# Patient Record
Sex: FEMALE | Race: WHITE | Employment: UNEMPLOYED | URBAN - METROPOLITAN AREA
[De-identification: names, ages, dates, MRNs, and addresses within clinical notes are randomized per-mention and may not be internally consistent; named-entity substitution may affect disease eponyms.]

---

## 2017-03-17 DIAGNOSIS — M35.9 CONNECTIVE TISSUE DISEASE, UNDIFFERENTIATED (H): ICD-10-CM

## 2017-03-20 RX ORDER — PREDNISONE 5 MG/1
TABLET ORAL
Qty: 20 TABLET | Refills: 1 | Status: SHIPPED | OUTPATIENT
Start: 2017-03-20 | End: 2017-03-30

## 2017-03-20 NOTE — TELEPHONE ENCOUNTER
predniSONE (DELTASONE) 5 MG      Last Written Prescription Date:  11/15/16  Last Fill Quantity: 20,   # refills: 1  Last Office Visit : 10/27/16  Future Office visit:  3/30/17

## 2017-03-27 DIAGNOSIS — M35.9 DIFFUSE DISEASE OF CONNECTIVE TISSUE (H): ICD-10-CM

## 2017-03-27 RX ORDER — HYDROXYCHLOROQUINE SULFATE 200 MG/1
200 TABLET, FILM COATED ORAL 2 TIMES DAILY
Qty: 180 TABLET | Refills: 1 | Status: SHIPPED | OUTPATIENT
Start: 2017-03-27 | End: 2017-03-30

## 2017-03-27 NOTE — TELEPHONE ENCOUNTER
hydroxychloroquine (PLAQUENIL) 200 MG tablet  Last Written Prescription Date:  12/9/16  Last Fill Quantity: 180,   # refills: 0  Last Office Visit with [facility], [facility] or [facility] prescribing provider: 10/27/16  Future Office visit:   3/30/17    Eye exam not found .  letter  Sent.     Routing refill request to provider for review/approval because:   hydroxychloroquine (PLAQUENIL) 200 MG tablet. Eye exam not found.

## 2017-03-27 NOTE — LETTER
Hipolito Hawley,    Regular eye exams are required while taking Hydroxychloroquine (Plaquenil). These may be yearly or as determined by your eye specialist.    Although vision problems and loss of sight while taking hydroxchloroquine are very rare, notify your doctor if you notice changes in your vision. Visual changes experienced early on the medication or seen early during regular eye exams usually improve after stopping the medication.     Eye exams should be completed by an ophthalmologist who is experienced in monitoring for Hydroxchloroquine toxicity.    Exam may include visual field testing and slit lamp exam or other testing as determined by the ophthalmologist.     We received a refill request from your pharmacy. A copy of your current eye exam was not found in your medical record. Your Hydroxychloroquine prescription has been refilled for one month.  Please request your eye clinic to fax or mail a copy of your eye exam report to our clinic indicating that testing was completed for toxicity screening.        Sincerely,  Rheumatology Staff

## 2017-03-30 ENCOUNTER — OFFICE VISIT (OUTPATIENT)
Dept: RHEUMATOLOGY | Facility: CLINIC | Age: 47
End: 2017-03-30
Attending: INTERNAL MEDICINE
Payer: COMMERCIAL

## 2017-03-30 VITALS
WEIGHT: 142 LBS | SYSTOLIC BLOOD PRESSURE: 157 MMHG | HEART RATE: 88 BPM | DIASTOLIC BLOOD PRESSURE: 102 MMHG | BODY MASS INDEX: 24.37 KG/M2

## 2017-03-30 DIAGNOSIS — R21 FACIAL RASH: ICD-10-CM

## 2017-03-30 DIAGNOSIS — L30.9 PERIORBITAL DERMATITIS: ICD-10-CM

## 2017-03-30 DIAGNOSIS — M35.9 UNDIFFERENTIATED CONNECTIVE TISSUE DISEASE (H): Primary | ICD-10-CM

## 2017-03-30 DIAGNOSIS — M35.9 CONNECTIVE TISSUE DISEASE, UNDIFFERENTIATED (H): ICD-10-CM

## 2017-03-30 DIAGNOSIS — M35.9 UNDIFFERENTIATED CONNECTIVE TISSUE DISEASE (H): ICD-10-CM

## 2017-03-30 LAB
ALBUMIN SERPL-MCNC: 3.8 G/DL (ref 3.4–5)
ALBUMIN UR-MCNC: NEGATIVE MG/DL
ALT SERPL W P-5'-P-CCNC: 21 U/L (ref 0–50)
APPEARANCE UR: CLEAR
AST SERPL W P-5'-P-CCNC: 14 U/L (ref 0–45)
BASOPHILS # BLD AUTO: 0.1 10E9/L (ref 0–0.2)
BASOPHILS NFR BLD AUTO: 0.8 %
BILIRUB UR QL STRIP: NEGATIVE
COLOR UR AUTO: ABNORMAL
CREAT SERPL-MCNC: 0.7 MG/DL (ref 0.52–1.04)
CRP SERPL-MCNC: <2.9 MG/L (ref 0–8)
DEPRECATED CALCIDIOL+CALCIFEROL SERPL-MC: 15 UG/L (ref 20–75)
DIFFERENTIAL METHOD BLD: ABNORMAL
EOSINOPHIL # BLD AUTO: 0.2 10E9/L (ref 0–0.7)
EOSINOPHIL NFR BLD AUTO: 2.4 %
ERYTHROCYTE [DISTWIDTH] IN BLOOD BY AUTOMATED COUNT: 11.9 % (ref 10–15)
ERYTHROCYTE [SEDIMENTATION RATE] IN BLOOD BY WESTERGREN METHOD: 10 MM/H (ref 0–20)
GFR SERPL CREATININE-BSD FRML MDRD: 90 ML/MIN/1.7M2
GLUCOSE UR STRIP-MCNC: NEGATIVE MG/DL
HCT VFR BLD AUTO: 40.4 % (ref 35–47)
HGB BLD-MCNC: 13.7 G/DL (ref 11.7–15.7)
HGB UR QL STRIP: ABNORMAL
IMM GRANULOCYTES # BLD: 0.1 10E9/L (ref 0–0.4)
IMM GRANULOCYTES NFR BLD: 0.7 %
KETONES UR STRIP-MCNC: NEGATIVE MG/DL
LEUKOCYTE ESTERASE UR QL STRIP: NEGATIVE
LYMPHOCYTES # BLD AUTO: 2.2 10E9/L (ref 0.8–5.3)
LYMPHOCYTES NFR BLD AUTO: 24.8 %
MCH RBC QN AUTO: 33.9 PG (ref 26.5–33)
MCHC RBC AUTO-ENTMCNC: 33.9 G/DL (ref 31.5–36.5)
MCV RBC AUTO: 100 FL (ref 78–100)
MONOCYTES # BLD AUTO: 0.7 10E9/L (ref 0–1.3)
MONOCYTES NFR BLD AUTO: 7.4 %
NEUTROPHILS # BLD AUTO: 5.6 10E9/L (ref 1.6–8.3)
NEUTROPHILS NFR BLD AUTO: 63.9 %
NITRATE UR QL: NEGATIVE
NRBC # BLD AUTO: 0 10*3/UL
NRBC BLD AUTO-RTO: 0 /100
PH UR STRIP: 6 PH (ref 5–7)
PLATELET # BLD AUTO: 259 10E9/L (ref 150–450)
RBC # BLD AUTO: 4.04 10E12/L (ref 3.8–5.2)
RBC #/AREA URNS AUTO: 2 /HPF (ref 0–2)
SP GR UR STRIP: 1 (ref 1–1.03)
URN SPEC COLLECT METH UR: ABNORMAL
UROBILINOGEN UR STRIP-MCNC: 0 MG/DL (ref 0–2)
WBC # BLD AUTO: 8.8 10E9/L (ref 4–11)
WBC #/AREA URNS AUTO: <1 /HPF (ref 0–2)

## 2017-03-30 PROCEDURE — 86038 ANTINUCLEAR ANTIBODIES: CPT | Performed by: INTERNAL MEDICINE

## 2017-03-30 PROCEDURE — 99212 OFFICE O/P EST SF 10 MIN: CPT | Mod: ZF

## 2017-03-30 PROCEDURE — 85025 COMPLETE CBC W/AUTO DIFF WBC: CPT | Performed by: INTERNAL MEDICINE

## 2017-03-30 PROCEDURE — 82595 ASSAY OF CRYOGLOBULIN: CPT | Performed by: INTERNAL MEDICINE

## 2017-03-30 PROCEDURE — 83876 ASSAY MYELOPEROXIDASE: CPT | Performed by: INTERNAL MEDICINE

## 2017-03-30 PROCEDURE — 86200 CCP ANTIBODY: CPT | Performed by: INTERNAL MEDICINE

## 2017-03-30 PROCEDURE — 82040 ASSAY OF SERUM ALBUMIN: CPT | Performed by: INTERNAL MEDICINE

## 2017-03-30 PROCEDURE — 86160 COMPLEMENT ANTIGEN: CPT | Performed by: INTERNAL MEDICINE

## 2017-03-30 PROCEDURE — 81401 MOPATH PROCEDURE LEVEL 2: CPT | Performed by: INTERNAL MEDICINE

## 2017-03-30 PROCEDURE — 82565 ASSAY OF CREATININE: CPT | Performed by: INTERNAL MEDICINE

## 2017-03-30 PROCEDURE — 86431 RHEUMATOID FACTOR QUANT: CPT | Performed by: INTERNAL MEDICINE

## 2017-03-30 PROCEDURE — 83516 IMMUNOASSAY NONANTIBODY: CPT | Performed by: INTERNAL MEDICINE

## 2017-03-30 PROCEDURE — 85652 RBC SED RATE AUTOMATED: CPT | Performed by: INTERNAL MEDICINE

## 2017-03-30 PROCEDURE — 84460 ALANINE AMINO (ALT) (SGPT): CPT | Performed by: INTERNAL MEDICINE

## 2017-03-30 PROCEDURE — 82306 VITAMIN D 25 HYDROXY: CPT | Performed by: INTERNAL MEDICINE

## 2017-03-30 PROCEDURE — 36415 COLL VENOUS BLD VENIPUNCTURE: CPT | Performed by: INTERNAL MEDICINE

## 2017-03-30 PROCEDURE — 86225 DNA ANTIBODY NATIVE: CPT | Performed by: INTERNAL MEDICINE

## 2017-03-30 PROCEDURE — 86140 C-REACTIVE PROTEIN: CPT | Performed by: INTERNAL MEDICINE

## 2017-03-30 PROCEDURE — 86255 FLUORESCENT ANTIBODY SCREEN: CPT | Performed by: INTERNAL MEDICINE

## 2017-03-30 PROCEDURE — 81001 URINALYSIS AUTO W/SCOPE: CPT | Performed by: INTERNAL MEDICINE

## 2017-03-30 PROCEDURE — 84450 TRANSFERASE (AST) (SGOT): CPT | Performed by: INTERNAL MEDICINE

## 2017-03-30 RX ORDER — METRONIDAZOLE 7.5 MG/G
GEL TOPICAL 2 TIMES DAILY
Qty: 45 G | Refills: 0 | Status: SHIPPED | OUTPATIENT
Start: 2017-03-30 | End: 2017-05-08

## 2017-03-30 RX ORDER — PREDNISONE 5 MG/1
TABLET ORAL
Qty: 50 TABLET | Refills: 0 | Status: SHIPPED | OUTPATIENT
Start: 2017-03-30

## 2017-03-30 RX ORDER — DULOXETIN HYDROCHLORIDE 30 MG/1
30 CAPSULE, DELAYED RELEASE ORAL DAILY
Qty: 30 CAPSULE | Refills: 3 | Status: SHIPPED | OUTPATIENT
Start: 2017-03-30 | End: 2017-12-04

## 2017-03-30 ASSESSMENT — PAIN SCALES - GENERAL: PAINLEVEL: MILD PAIN (3)

## 2017-03-30 NOTE — NURSING NOTE
"Chief Complaint   Patient presents with     RECHECK     undifferentiated CTD       Initial BP (!) 157/102  Pulse 88  Wt 64.4 kg (142 lb)  BMI 24.37 kg/m2 Estimated body mass index is 24.37 kg/(m^2) as calculated from the following:    Height as of 7/1/16: 1.626 m (5' 4\").    Weight as of this encounter: 64.4 kg (142 lb).  Medication Reconciliation: complete    "

## 2017-03-30 NOTE — PROGRESS NOTES
Rheumatology F/U Note    Date of initial visit: 1/7/2015    Last visit date: 7/1/2016    Visit date: 3/30/2017    Reason for visit: UCTD Dx 1/2015 (arthralgia, myalgia, fatigue, oral/nasal ulcers, raynaud's, sicca sx with mild siladenitis on lip biopsy/focal score=0 1/2015, evidence of early IA on x-rays, low C3), vit D deficiency, abnormal AST/ALT      HPI from initial visit:    Marleen Castillo is a 45 yo WF with strong FH/o lupus/MCTD and personal h/o urticaria, FMS who was referred to our clinic for evaluation and management of her arthralgia and myalgia.    She was in her 20s, when she started getting hives, joint pain as flare ups lasting 24-48 hr along with headaches. She had neg rheumatologic work up in 2010 and 2013. She has been seen by 2 rheumatologists but has not been diagnosed with CTD. She was diagnosed with FMS. She was seen by allergist for hives. Reports having steroid shots for throat swelling and hives. Always keeps prednisone. Noticed improvement of fatigue, joint pain on steroid as well.    Her sx are getting worse. She was able to live a nl life but now flares are more often.     Has significant extreme fatigue which is now constant, used to happen with flares before. Gets hives with contact. Has cold intolerance. Gets sweating with cold feeling. Has hair loss but has too much hair as baseline and hair loss is not noticeable. Gets hives in reaction to Sun. Gets raynaud's with purple discoloration, cold feeling/numbness/tingling/pain in fingers/toes. Has raynaud's sometimes. Gets oral/nasal ulcers. Reports episodes of solid/liquid dysphagia. Has H/o sicca sx x many years. She uses eyedrops twice a day. She uses biotene products and drinks water all the time. Gets epistaxis with dry nose. Reports diffuse arthralgia and myalgia. Feels swollen all over. Takes tramadol to help with AM stiffness which is 2 hr. Gets depressed because of not feeling well. Gets intermittent constipation/diarrhea. Has  migraines. Gets abdominal pain with hives. Gets heartburn. Has h/o blood transfusion.      Her aunt has lupus and autoimmune hepatitis and her cousin (aunt's daughter) has MCTD with features of lupus/scleroderma. Both family members are my patients and they encouraged Marleen to be seen in my clinic today as they believe FMS can not explain all Sanam's sx.      6/2015: At initial visit in 1/2015, was found having low C3, low vit D and evidence of early IA on x-rays. Had abnormal AST/ALT and was advised to do further labs before prescribing any DMARDs which has not been done yet. Lip biopsy showed focal score of zero, she had mild sialadenitis.    She had an episode of LOC/fall leading to ICH/facial Fx on 3/7/2015, had surgery. Is not on anti-seizure meds, there is no explanation for this event. Has a f/u appointment with neurosurgery as outpatient. She can't remember anything and does not know what happened.    She took prednisone taper since last visit to help her flare up (helped). Her legs were heavy, had fatigue. Had oral ulcers. Had numbness/tingling in hands/fingers.     Today, her major complaint is pain in arms/legs which gets worse with moving around. Sx are unchanged since last visit.    She started HCQ 3 days ago. Has depression. Fingers, feet pain. Swelling all over. Faigue. AM stiffness x 2 hr. 2-3 glasses wine/day since 1/2015.    7/2016: Was seen last yr, lost f/u since then. She is done with detox, now is in treatment center, will be there x 45 days. She is sober since 1/2016. She took herself off most meds on her list including HCQ as felt she is taking so many meds. Continues to have fatigue, reports occasional hives and flare ups of throat swelling which responds to prednisone 40 mg qd. If she does use hands, they hurt. It comes as flare up. hands become numb in am, AM stiffness is 2 hr. Takes ibuprofen, sometimes it helps. Gets migraines. Tramadol used to help with pain but now she is off of it.  Still gets oral ulcers, worse off HCQ. Has left over prednisone and took few times for throat swelling/hives, requests another Rx.  She takes it once in a while, once a month.    Can't recall if HCQ helped with other sx besides oral ulcers. Might be off HCQ since 3/2016 but can't recall when she stopped taking it.    Today: Her major complaint is fatigue, arthralgia and myalgia.    She is sober and not taking any pain meds. She has applied disability and was turned down.    Reports headaches, arthralgia (wrists, knees), back pain. Sx vary day to day.    Reports patches of hair loss, spots over face.    Toenails look funny. She was put on antifungal tx x 2 mo, did not help.    There are days that she can't open pill bottles as hands hurt, especially base of thumbs.    Reports AM stiffness x one hour.    She is still taking HCQ, had eye exam 2 wk ago.    Reports dry eyes/dry mouth. Uses refresh eyedrops.      ROS:  A comprehensive ROS was done, positives are per HPI.          Her records were reviewed.    1/2013: NL CBC    2/2013: Neg JAY, RF, EMILY (anti-DNA, SSA, SSB, RNP, Sm, SCL-70, Alexandra-1), lyme, APS panel (LA, aCL IgM/IgG, beta 2 GP I IgM/IgG), NL C3/C4, mitochondrial/smooth muscle Ab/thyroid peroxidase Ab, NL CRP/Vit D, neg U/A  Component      Latest Ref Rng 1/7/2015   WBC      4.0 - 11.0 10e9/L 9.3   RBC Count      3.8 - 5.2 10e12/L 4.14   Hemoglobin      11.7 - 15.7 g/dL 14.3   Hematocrit      35.0 - 47.0 % 40.9   MCV      78 - 100 fl 99   MCH      26.5 - 33.0 pg 34.5 (H)   MCHC      31.5 - 36.5 g/dL 35.0   RDW      10.0 - 15.0 % 13.0   Platelet Count      150 - 450 10e9/L 255   Diff Method       Automated Method   % Neutrophils       80.1   % Lymphocytes       18.4   % Monocytes       1.2   % Eosinophils       0.1   % Basophils       0.1   % Immature Granulocytes       0.1   Absolute Neutrophil      1.6 - 8.3 10e9/L 7.4   Absolute Lymphocytes      0.8 - 5.3 10e9/L 1.7   Absolute Monoctyes      0.0 - 1.3 10e9/L  0.1   Absolute Eosinophils      0.0 - 0.7 10e9/L 0.0   Absolute Basophils      0.0 - 0.2 10e9/L 0.0   Abs Immature Granulocytes      0 - 0.4 10e9/L 0.0   Sodium      133 - 144 mmol/L 136   Potassium      3.4 - 5.3 mmol/L 3.3 (L)   Chloride      94 - 109 mmol/L 106   Carbon Dioxide      20 - 32 mmol/L 25   Anion Gap      3 - 14 mmol/L 5   Glucose      70 - 99 mg/dL 85   Urea Nitrogen      7 - 30 mg/dL 8   Creatinine      0.52 - 1.04 mg/dL 0.67   GFR Estimate      >60 mL/min/1.7m2 >90    Non  GFR Calc   GFR Estimate If Black      >60 mL/min/1.7m2 >90    African American GFR Calc   Calcium      8.5 - 10.1 mg/dL 8.6   Bilirubin Total      0.2 - 1.3 mg/dL 0.7   Albumin      3.4 - 5.0 g/dL 3.8   Protein Total      6.8 - 8.8 g/dL 6.9   Alkaline Phosphatase      40 - 150 U/L 127   ALT      0 - 50 U/L 331 (H)   AST      0 - 45 U/L 453 (H)   Color Urine       Straw   Appearance Urine       Clear   Glucose Urine      NEG mg/dL Negative   Bilirubin Urine      NEG Negative   Ketones Urine      NEG mg/dL Negative   Specific Gravity Urine      1.003 - 1.035 1.003   Blood Urine      NEG Trace (A)   pH Urine      5.0 - 7.0 pH 5.5   Protein Albumin Urine      NEG mg/dL Negative   Urobilinogen mg/dL      0.0 - 2.0 mg/dL Normal   Nitrite Urine      NEG Negative   Leukocyte Esterase Urine      NEG Negative   Source       Midstream Urine   WBC Urine      0 - 2 /HPF <1   RBC Urine      0 - 2 /HPF <1   Albumin Fraction      3.7 - 5.1 g/dL 4.1   Alpha 1 Fraction      0.2 - 0.4 g/dL 0.3   Alpha 2 Fraction      0.5 - 0.9 g/dL 0.7   Beta Fraction      0.6 - 1.0 g/dL 0.7   Gamma Fraction      0.7 - 1.6 g/dL 0.9   Monoclonal Peak      0.0 g/dL 0.0   ELP Interpretation:       Essentially normal electrophoretic pattern.  No monoclonal protein seen.     Pathologic significance requires clinical correlation.  WADE White M.D.,     Ph.D., Pathologist ().   RNP Antibody IgG      0.0 - 0.9 AI <0.2    Negative     Antibody  index (AI) values reflect qualitative changes in antibody     concentration that cannot be directly associated with clinical condition or     disease state.   Smith EMILY Antibody IgG      0.0 - 0.9 AI <0.2    Negative     Antibody index (AI) values reflect qualitative changes in antibody     concentration that cannot be directly associated with clinical condition or     disease state.   SSA (Ro) (EMILY) Antibody, IgG      0.0 - 0.9 AI <0.2    Negative     Antibody index (AI) values reflect qualitative changes in antibody     concentration that cannot be directly associated with clinical condition or     disease state.   SSB (La) (EMILY) Antibody, IgG      0.0 - 0.9 AI <0.2    Negative     Antibody index (AI) values reflect qualitative changes in antibody     concentration that cannot be directly associated with clinical condition or     disease state.   Scleroderma Antibody Scl-70 EMILY IgG      0.0 - 0.9 AI <0.2    Negative     Antibody index (AI) values reflect qualitative changes in antibody     concentration that cannot be directly associated with clinical condition or     disease state.   Result       SEE NOTE 01/14/2015 07:50 PM    (Note)        Test                                Result    Flag  Unit  RefValue    ------------------------------------------------------------------    RNA Polymerase III Ab, IgG, S       <10.0           U       -- REFERENCE VALUE --       <20.0 (Negative)           Test Performed by:       [address] [address]       : Francisco Ko M.D.   Test Name       RNA POLYMERASE III ANTIBODIES IGG   Send Outs Misc Test Code       83939   Send Outs Misc Test Specimen       Serum   IGG      695 - 1620 mg/dL 842   IGA      70 - 380 mg/dL 98   IGM      60 - 265 mg/dL 237   Protein Random Urine       <0.05   Protein Total Urine g/gr Creatinine      0 - 0.2 g/g Cr Unable to calculate due to low value    Beta-2-Glycopro IgG       0   Beta-2-Glycopro IgM       6   Cardiolipin IgG Melvi      0 - 15.0 GPL <15.0    Interpretation:  Negative   Cardiolipin IgM Melvi      0 - 12.5 MPL 17.4 (H)   JAY Screen by EIA      <1.0 <1.0    Interpretation:  Negative   Complement C3      76 - 169 mg/dL 70 (L)   Complement C4      15 - 50 mg/dL 18   CRP Inflammation      0.0 - 8.0 mg/L <2.9   Sed Rate      0 - 20 mm/h 9   DNA-ds      0 - 29 IU/mL <15    Interpretation:  Negative   Cyclic Cit Pept IgG/IgA      <20 UNITS <20    Interpretation:  Negative   Rheumatoid Factor      <20 IU/mL <20   Hepatitis C Antibody      NR Nonreactive     Assay performance characteristics have not been established for newborns,     infants, and children   Vitamin D Deficiency screening      30 - 75 ug/L 21 (L)   TSH      0.40 - 4.00 mU/L 3.95   Treponema pallidum Antibody      NEG Negative   CYRUS  Broad Spectrum       Neg   Lupus Result      NEG Negative    (Note)    COMMENTS:    The INR is normal.    APTT is normal.  1:2 Mix is not indicated.    DRVVT Screen is normal.    Thrombin time is normal.    NEGATIVE TEST; A LUPUS ANTICOAGULANT WAS NOT DETECTED IN THIS    SPECIMEN WITHIN THE LIMITS OF THE TESTING REPERTOIRE.    If the clinical picture is strongly suggestive of an antiphospholipid    syndrome, recommend anticardiolipin and beta-2-glycoprotein (IgG and    IgM) antibody tests.    Selene Sparks M.D.  [phone]    1/9/2015        INR =   0.98     Reference range: 0.86-1.14    Thrombin Time=  15.7     Reference range: 13.0-19.0 sec        APTT:     Seconds    Reagent =   Stago LA    Patient  =   39    1:2 Mix  =   N/A    Reference range=   31-45         DILUTE DELMI VIPER VENOM TEST:    DRVVT Screen Ratio =  0.85    Normal is less than 1.21   Miscellaneous Test       Specimen Received, Reordered and sent to Performing laboratory - Report to     follow upon completion.   Centromere Antibody IgG      0.0 - 0.9 AI <0.2    Negative     Antibody  index (AI) values reflect qualitative changes in antibody     concentration that cannot be directly associated with clinical condition or     disease state.   Creatinine Urine       16   XR WRIST BILATERAL G/E 3 VW, XR HAND BILATERAL G/E 3 VW 1/7/2015  5:02 PM   HISTORY: eval for RA, r/o erosions,Pain in joint, site unspecified  COMPARISON: None available.  FINDINGS: AP, PA, and oblique views of the bilateral hands and wrists  were obtained.  In the wrists, the alignment of the carpal bones is unremarkable. No  acute fractures noted. No significant erosions or degenerative disease  identified.  In the hand, the joint spaces are maintained. The alignment is within  normal limits. There are no acute fractures identified. There is  subtle periarticular osteopenia at the metacarpal heads and bases of  the proximal phalanges.  IMPRESSION  IMPRESSION:   1. Subtle periarticular osteopenia in the hand.  2. No acute fractures identified.  I have personally reviewed the examination and initial interpretation  and I agree with the findings.    SURGICAL PATHOLOGY EXAM    Result  Value  Ref Range     Copath Report       Value:  Patient Name: LIZETH HAYES  MR#: [id number]  Specimen #:   Collected: 1/20/2015  Received: 1/20/2015  Reported: 1/23/2015 20:16  Ordering Phy(s): SHIELA POP              SPECIMEN(S):  Lip biopsy    FINAL DIAGNOSIS:  Oral mucosa, lower lip, biopsy:  -Lobules of minor salivary glands with mild non-specific sialadenitis  -Focus score = 0    I have personally reviewed all specimens and or slides, including the  listed special stains, and used them with my medical judgement to  determine the final diagnosis.    Electronically signed out by:    Laney Kuamr M.D., [facility]      CLINICAL HISTORY:  The patient is a 44-year-old woman who undergoes evaluation of Sj?gren's  disease. Operative procedure: Lower lip biopsy.      GROSS:  The specimen is received in formalin with proper patient  "identification,  labeled \"lip biopsy\". The specimen consists of two pink-tan soft tissue  fragments averaging 0.5 cm in greatest dimension, which are wrapped and  entirely submitted in cassett      e 1. (Dictated by: Yecenia Ruiz 1/20/2015  05:45 PM)    MICROSCOPIC:  Microscopic examination is performed.              CPT Codes:  A: 85274-LR3    TESTING LAB LOCATION:  [facility], [address] [address] [phone]    COLLECTION SITE:  Client: [facility]  Location: [facility] (B)       Component      Latest Ref Rng 6/26/2015   WBC      4.0 - 11.0 10e9/L 12.0 (H)   RBC Count      3.8 - 5.2 10e12/L 4.15   Hemoglobin      11.7 - 15.7 g/dL 14.2   Hematocrit      35.0 - 47.0 % 40.8   MCV      78 - 100 fl 98   MCH      26.5 - 33.0 pg 34.2 (H)   MCHC      31.5 - 36.5 g/dL 34.8   RDW      10.0 - 15.0 % 13.2   Platelet Count      150 - 450 10e9/L 215   Diff Method       Automated Method   % Neutrophils       70.5   % Lymphocytes       21.2   % Monocytes       4.9   % Eosinophils       2.8   % Basophils       0.3   % Immature Granulocytes       0.3   Absolute Neutrophil      1.6 - 8.3 10e9/L 8.4 (H)   Absolute Lymphocytes      0.8 - 5.3 10e9/L 2.5   Absolute Monocytes      0.0 - 1.3 10e9/L 0.6   Absolute Eosinophils      0.0 - 0.7 10e9/L 0.3   Absolute Basophils      0.0 - 0.2 10e9/L 0.0   Abs Immature Granulocytes      0 - 0.4 10e9/L 0.0   Sodium      133 - 144 mmol/L 136   Potassium      3.4 - 5.3 mmol/L 3.7   Chloride      94 - 109 mmol/L 106   Carbon Dioxide      20 - 32 mmol/L 26   Anion Gap      3 - 14 mmol/L 5   Glucose      70 - 99 mg/dL 77   Urea Nitrogen      7 - 30 mg/dL 8   Creatinine      0.52 - 1.04 mg/dL 0.77   GFR Estimate      >60 mL/min/1.7m2 81   GFR Estimate If Black      >60 mL/min/1.7m2 >90 . . .   Calcium      8.5 - 10.1 mg/dL 8.8   Bilirubin Total      0.2 - 1.3 mg/dL 0.5   Albumin      " 3.4 - 5.0 g/dL 4.1   Protein Total      6.8 - 8.8 g/dL 7.8   Alkaline Phosphatase      40 - 150 U/L 60   ALT      0 - 50 U/L 20   AST      0 - 45 U/L 20   Color Urine       Straw   Appearance Urine       Clear   Glucose Urine      NEG mg/dL Negative   Bilirubin Urine      NEG Negative   Ketones Urine      NEG mg/dL Negative   Specific Gravity Urine      1.003 - 1.035 1.003   Blood Urine      NEG Small (A)   pH Urine      5.0 - 7.0 pH 5.0   Protein Albumin Urine      NEG mg/dL Negative   Urobilinogen mg/dL      0.0 - 2.0 mg/dL Normal   Nitrite Urine      NEG Negative   Leukocyte Esterase Urine      NEG Negative   Source       Midstream Urine   WBC Urine      0 - 2 /HPF <1   RBC Urine      0 - 2 /HPF 1   Squamous Epithelial /HPF Urine      0 - 1 /HPF <1   Protein Random Urine       <0.05   Protein Total Urine g/gr Creatinine      0 - 0.2 g/g Cr Unable to calculate due to low value   Cardiolipin IgG Melvi      0 - 15.0 GPL <15.0 . . .   Cardiolipin IgM Melvi      0 - 12.5 MPL <12.5 . . .   Complement C3      76 - 169 mg/dL 109   Complement C4      15 - 50 mg/dL 23   CRP Inflammation      0.0 - 8.0 mg/L <2.9   Sed Rate      0 - 20 mm/h 11   DNA-ds      0 - 29 IU/mL <15 . . .   Creatinine Urine Random       22   Vitamin D Deficiency screening      30 - 75 ug/L 26 (L)         HISTORY REVIEW:  Past Medical History:   Diagnosis Date     Anxiety      Fibromyalgia      Hernia, ventral      Herpes simplex      Hives      Insomnia      Past Surgical History:   Procedure Laterality Date     C EXC VENTRAL HERNIA MESH W/DRAIN ABSCESS  2013    with mesh implantation      SECTION  x 2     CHOLECYSTECTOMY       EXCISE NODULE THYROID       HYSTERECTOMY  age 24    ovaries remained, for heavy periods     Family History   Problem Relation Age of Onset     DIABETES Mother      DIABETES Father      Hypertension Daughter      Depression Son      CEREBROVASCULAR DISEASE Maternal Grandmother      Lupus Maternal Aunt      cousin has MCTD      Social History     Social History     Marital status:      Spouse name: N/A     Number of children: 3     Years of education: N/A     Occupational History     unemployed      , 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    PMHx, FHx, SHx were reviewed, unchanged.      Outpatient Encounter Prescriptions as of 3/30/2017   Medication Sig Dispense Refill     hydroxychloroquine (PLAQUENIL) 200 MG tablet Take 1 tablet (200 mg) by mouth 2 times daily 180 tablet 0     RANITIDINE HCL PO Take by mouth 2 times daily       triamcinolone (KENALOG) 0.1 % paste Take by mouth 2 times daily Twice a day as needed for mouth/nose sores 5 g 3     atenolol (TENORMIN) 25 MG tablet Take 100 mg by mouth        ibuprofen (ADVIL,MOTRIN) 200 MG tablet        valACYclovir (VALTREX) 500 MG tablet        [DISCONTINUED] hydroxychloroquine (PLAQUENIL) 200 MG tablet Take 1 tablet (200 mg) by mouth 2 times daily 180 tablet 1     predniSONE (DELTASONE) 5 MG tablet TAKE ONE TABLET BY MOUTH AS DIRECTED AS NEEDED FOR FLARE UPS (Patient not taking: Reported on 3/30/2017) 20 tablet 1     [DISCONTINUED] vitamin D (ERGOCALCIFEROL) 35236 UNIT capsule Take 1 capsule (50,000 Units) by mouth every 7 days 12 capsule 0     [DISCONTINUED] TRIAZOLAM PO Take by mouth nightly as needed for sleep       [DISCONTINUED] fluconazole (DIFLUCAN) 150 MG tablet        No facility-administered encounter medications on file as of 3/30/2017.      No Known Allergies    Ph.E:    Vitals:    17 1130   BP: (!) 157/102   Pulse: 88   Weight: 64.4 kg (142 lb)         Constitutional:  WD/WN. Pleasant. In no acute distress. Mother present.  Eyes: EOM intact, PERRLA, sclera anicteric, conj not injected  HEENT: No oral ulcers or thrush. NL salivary pool.   Neck: No cervical LAP or thyromegaly  Chest: Clear to auscultation bilaterally  CV: RRR, no murmurs/ rubs or gallops. No edema, clubbing or cyanosis.   GI: Abdomen is soft and non tender.   MS: No joint tenderness or active synovitis. Cool joints. No  joint deformities. Full ROM of the joints. No nodules.   Skin: No skin rash, malar rash, livedo, alopecia, digital ulcers or nail changes. +periungual erythema.  Neuro: A&O x 3. Grossly non focal  Psych: NL affect and mood    Assessment/ plan:    UCTD Dx 1/2015. 48 yo WF with h/o FMS, heavy ETOH use and +fh/o lupus/MCTD presented in 1/2015 with sx which were highly concerning for CTD (fatigue, IA, myalgia, sicca sx, raynaud's, oral/nasal ulcers). Further work up showed low C3, low titer +aCL IgM (less than 20) and evidence of early IA on x-rays. Was diagnosed with UCTD, did not meet diagnostic criteria for SLE or Sjogren's (focus score=0 on lip biopsy done 1/2015). Repeat labs in 6/2015, showed NL C3 and IgM. Lost f/u since 6/2015. She was on  mg qd 6/2015- possibly 3/2016 (can't recall stop date). She went through ETOH detox program and decided to take herself off many drugs, HCQ was one of them. Can't recall if it helped her or not as was busy dealing with ETOH withdrawal sx. Later noticed increased oral ulcers off HCQ.     HCQ was resumed in 10/2016 at 200 m gpo bid. Has several complaints today including ongoing arthralgia, hair loss, skin rashes and nail change but fatigue is her major complaint.      Fatigue could be due to chronic fatigue/FMS. Recommend a trial of cymbalta 30 mg po qd; risks were discussed. If i tdoes not help, could take prednisone: 4tab=20 mg qd x 5 days, 3tab=15 mg qd x 5 days, 2tab=10 mg qd x 5 days, 1 tab=5 mg qd x 5 days then stop as it could be CTD  related.    Labs today, if NL TPMT, will add imuran 50 mg a day as her ongoing arthralgia could have an inflammatory component. Recommend to avoid MTX given h/o ETOH use.    Continue  mg bid     Kenalog in orabase paste for oral ulcers.    Vit D deficiency. It was replaced. Re-check level.    Abnormal AST/ALT in the past with neg hep B/C and autoimmune work up, h/o ETOH use which could explain abnormal LFTs. Re-check LFTs were WNL.    +aCL IgM. Low titer+ but below 20, re-check was nl in 6/2015.    LOC on 3/7/2015 lead to ICH s/p surgery. It was due to ETOH use not seizures.    HCQ monitoring. Was reminded to have eye exam    Hair, skin, nail changes. Refer to derm to see if it's related to CTD    PLAN: Follow up prn, she wishes to transfer care to [place] which is close to her. i recommended her to see Dr. Mancia.    MEDICATION CHANGES: as above    Orders Placed This Encounter   Procedures     Albumin level     ALT     AST     CBC with platelets differential     Creatinine     CRP inflammation     Antinuclear antibody screen by EIA     ESR     Cryoglobulin quantitative     Antineutrophil cytoplasmic Melvi IgG     Vasculitis panel     Complement C3     Complement C4     DNA double stranded antibodies     Rheumatoid factor     Cyclic Citrullinated Peptide Antibody IgG     TPMT Genotype     RHEUMATOLOGY REFERRAL     DERMATOLOGY REFERRAL     INTERNAL MEDICINE REFERRAL

## 2017-03-30 NOTE — LETTER
3/30/2017      RE: Marleen Castillo  [address] [place] [address]       Rheumatology F/U Note    Date of initial visit: 1/7/2015    Last visit date: 7/1/2016    Visit date: 3/30/2017    Reason for visit: UCTD Dx 1/2015 (arthralgia, myalgia, fatigue, oral/nasal ulcers, raynaud's, sicca sx with mild siladenitis on lip biopsy/focal score=0 1/2015, evidence of early IA on x-rays, low C3), vit D deficiency, abnormal AST/ALT      HPI from initial visit:    Marleen Castillo is a 43 yo WF with strong FH/o lupus/MCTD and personal h/o urticaria, FMS who was referred to our clinic for evaluation and management of her arthralgia and myalgia.    She was in her 20s, when she started getting hives, joint pain as flare ups lasting 24-48 hr along with headaches. She had neg rheumatologic work up in 2010 and 2013. She has been seen by 2 rheumatologists but has not been diagnosed with CTD. She was diagnosed with FMS. She was seen by allergist for hives. Reports having steroid shots for throat swelling and hives. Always keeps prednisone. Noticed improvement of fatigue, joint pain on steroid as well.    Her sx are getting worse. She was able to live a nl life but now flares are more often.     Has significant extreme fatigue which is now constant, used to happen with flares before. Gets hives with contact. Has cold intolerance. Gets sweating with cold feeling. Has hair loss but has too much hair as baseline and hair loss is not noticeable. Gets hives in reaction to Sun. Gets raynaud's with purple discoloration, cold feeling/numbness/tingling/pain in fingers/toes. Has raynaud's sometimes. Gets oral/nasal ulcers. Reports episodes of solid/liquid dysphagia. Has H/o sicca sx x many years. She uses eyedrops twice a day. She uses biotene products and drinks water all the time. Gets epistaxis with dry nose. Reports diffuse arthralgia and myalgia. Feels swollen all over. Takes tramadol to help with AM stiffness which is 2 hr. Gets depressed because  of not feeling well. Gets intermittent constipation/diarrhea. Has migraines. Gets abdominal pain with hives. Gets heartburn. Has h/o blood transfusion.      Her aunt has lupus and autoimmune hepatitis and her cousin (aunt's daughter) has MCTD with features of lupus/scleroderma. Both family members are my patients and they encouraged Marleen to be seen in my clinic today as they believe FMS can not explain all Sanam's sx.      6/2015: At initial visit in 1/2015, was found having low C3, low vit D and evidence of early IA on x-rays. Had abnormal AST/ALT and was advised to do further labs before prescribing any DMARDs which has not been done yet. Lip biopsy showed focal score of zero, she had mild sialadenitis.    She had an episode of LOC/fall leading to ICH/facial Fx on 3/7/2015, had surgery. Is not on anti-seizure meds, there is no explanation for this event. Has a f/u appointment with neurosurgery as outpatient. She can't remember anything and does not know what happened.    She took prednisone taper since last visit to help her flare up (helped). Her legs were heavy, had fatigue. Had oral ulcers. Had numbness/tingling in hands/fingers.     Today, her major complaint is pain in arms/legs which gets worse with moving around. Sx are unchanged since last visit.    She started HCQ 3 days ago. Has depression. Fingers, feet pain. Swelling all over. Faigue. AM stiffness x 2 hr. 2-3 glasses wine/day since 1/2015.    7/2016: Was seen last yr, lost f/u since then. She is done with detox, now is in treatment center, will be there x 45 days. She is sober since 1/2016. She took herself off most meds on her list including HCQ as felt she is taking so many meds. Continues to have fatigue, reports occasional hives and flare ups of throat swelling which responds to prednisone 40 mg qd. If she does use hands, they hurt. It comes as flare up. hands become numb in am, AM stiffness is 2 hr. Takes ibuprofen, sometimes it helps. Gets  migraines. Tramadol used to help with pain but now she is off of it. Still gets oral ulcers, worse off HCQ. Has left over prednisone and took few times for throat swelling/hives, requests another Rx.  She takes it once in a while, once a month.    Can't recall if HCQ helped with other sx besides oral ulcers. Might be off HCQ since 3/2016 but can't recall when she stopped taking it.    Today: Her major complaint is fatigue, arthralgia and myalgia.    She is sober and not taking any pain meds. She has applied disability and was turned down.    Reports headaches, arthralgia (wrists, knees), back pain. Sx vary day to day.    Reports patches of hair loss, spots over face.    Toenails look funny. She was put on antifungal tx x 2 mo, did not help.    There are days that she can't open pill bottles as hands hurt, especially base of thumbs.    Reports AM stiffness x one hour.    She is still taking HCQ, had eye exam 2 wk ago.    Reports dry eyes/dry mouth. Uses refresh eyedrops.      ROS:  A comprehensive ROS was done, positives are per HPI.          Her records were reviewed.    1/2013: NL CBC    2/2013: Neg JAY, RF, EMILY (anti-DNA, SSA, SSB, RNP, Sm, SCL-70, Alexandra-1), lyme, APS panel (LA, aCL IgM/IgG, beta 2 GP I IgM/IgG), NL C3/C4, mitochondrial/smooth muscle Ab/thyroid peroxidase Ab, NL CRP/Vit D, neg U/A  Component      Latest Ref Rng 1/7/2015   WBC      4.0 - 11.0 10e9/L 9.3   RBC Count      3.8 - 5.2 10e12/L 4.14   Hemoglobin      11.7 - 15.7 g/dL 14.3   Hematocrit      35.0 - 47.0 % 40.9   MCV      78 - 100 fl 99   MCH      26.5 - 33.0 pg 34.5 (H)   MCHC      31.5 - 36.5 g/dL 35.0   RDW      10.0 - 15.0 % 13.0   Platelet Count      150 - 450 10e9/L 255   Diff Method       Automated Method   % Neutrophils       80.1   % Lymphocytes       18.4   % Monocytes       1.2   % Eosinophils       0.1   % Basophils       0.1   % Immature Granulocytes       0.1   Absolute Neutrophil      1.6 - 8.3 10e9/L 7.4   Absolute Lymphocytes       0.8 - 5.3 10e9/L 1.7   Absolute Monoctyes      0.0 - 1.3 10e9/L 0.1   Absolute Eosinophils      0.0 - 0.7 10e9/L 0.0   Absolute Basophils      0.0 - 0.2 10e9/L 0.0   Abs Immature Granulocytes      0 - 0.4 10e9/L 0.0   Sodium      133 - 144 mmol/L 136   Potassium      3.4 - 5.3 mmol/L 3.3 (L)   Chloride      94 - 109 mmol/L 106   Carbon Dioxide      20 - 32 mmol/L 25   Anion Gap      3 - 14 mmol/L 5   Glucose      70 - 99 mg/dL 85   Urea Nitrogen      7 - 30 mg/dL 8   Creatinine      0.52 - 1.04 mg/dL 0.67   GFR Estimate      >60 mL/min/1.7m2 >90    Non  GFR Calc   GFR Estimate If Black      >60 mL/min/1.7m2 >90    African American GFR Calc   Calcium      8.5 - 10.1 mg/dL 8.6   Bilirubin Total      0.2 - 1.3 mg/dL 0.7   Albumin      3.4 - 5.0 g/dL 3.8   Protein Total      6.8 - 8.8 g/dL 6.9   Alkaline Phosphatase      40 - 150 U/L 127   ALT      0 - 50 U/L 331 (H)   AST      0 - 45 U/L 453 (H)   Color Urine       Straw   Appearance Urine       Clear   Glucose Urine      NEG mg/dL Negative   Bilirubin Urine      NEG Negative   Ketones Urine      NEG mg/dL Negative   Specific Gravity Urine      1.003 - 1.035 1.003   Blood Urine      NEG Trace (A)   pH Urine      5.0 - 7.0 pH 5.5   Protein Albumin Urine      NEG mg/dL Negative   Urobilinogen mg/dL      0.0 - 2.0 mg/dL Normal   Nitrite Urine      NEG Negative   Leukocyte Esterase Urine      NEG Negative   Source       Midstream Urine   WBC Urine      0 - 2 /HPF <1   RBC Urine      0 - 2 /HPF <1   Albumin Fraction      3.7 - 5.1 g/dL 4.1   Alpha 1 Fraction      0.2 - 0.4 g/dL 0.3   Alpha 2 Fraction      0.5 - 0.9 g/dL 0.7   Beta Fraction      0.6 - 1.0 g/dL 0.7   Gamma Fraction      0.7 - 1.6 g/dL 0.9   Monoclonal Peak      0.0 g/dL 0.0   ELP Interpretation:       Essentially normal electrophoretic pattern.  No monoclonal protein seen.     Pathologic significance requires clinical correlation.  WADE White M.D.,     Ph.D., Pathologist ().    RNP Antibody IgG      0.0 - 0.9 AI <0.2    Negative     Antibody index (AI) values reflect qualitative changes in antibody     concentration that cannot be directly associated with clinical condition or     disease state.   Smith EMILY Antibody IgG      0.0 - 0.9 AI <0.2    Negative     Antibody index (AI) values reflect qualitative changes in antibody     concentration that cannot be directly associated with clinical condition or     disease state.   SSA (Ro) (EMILY) Antibody, IgG      0.0 - 0.9 AI <0.2    Negative     Antibody index (AI) values reflect qualitative changes in antibody     concentration that cannot be directly associated with clinical condition or     disease state.   SSB (La) (EMILY) Antibody, IgG      0.0 - 0.9 AI <0.2    Negative     Antibody index (AI) values reflect qualitative changes in antibody     concentration that cannot be directly associated with clinical condition or     disease state.   Scleroderma Antibody Scl-70 EMILY IgG      0.0 - 0.9 AI <0.2    Negative     Antibody index (AI) values reflect qualitative changes in antibody     concentration that cannot be directly associated with clinical condition or     disease state.   Result       SEE NOTE 01/14/2015 07:50 PM    (Note)        Test                                Result    Flag  Unit  RefValue    ------------------------------------------------------------------    RNA Polymerase III Ab, IgG, S       <10.0           U       -- REFERENCE VALUE --       <20.0 (Negative)           Test Performed by:       [facility] [address] [address]       : Francisco Ko M.D.   Test Name       RNA POLYMERASE III ANTIBODIES IGG   Send Outs Misc Test Code       89611   Send Outs Misc Test Specimen       Serum   IGG      695 - 1620 mg/dL 842   IGA      70 - 380 mg/dL 98   IGM      60 - 265 mg/dL 237   Protein Random Urine       <0.05   Protein Total Urine g/gr  Creatinine      0 - 0.2 g/g Cr Unable to calculate due to low value   Beta-2-Glycopro IgG       0   Beta-2-Glycopro IgM       6   Cardiolipin IgG Melvi      0 - 15.0 GPL <15.0    Interpretation:  Negative   Cardiolipin IgM Melvi      0 - 12.5 MPL 17.4 (H)   JAY Screen by EIA      <1.0 <1.0    Interpretation:  Negative   Complement C3      76 - 169 mg/dL 70 (L)   Complement C4      15 - 50 mg/dL 18   CRP Inflammation      0.0 - 8.0 mg/L <2.9   Sed Rate      0 - 20 mm/h 9   DNA-ds      0 - 29 IU/mL <15    Interpretation:  Negative   Cyclic Cit Pept IgG/IgA      <20 UNITS <20    Interpretation:  Negative   Rheumatoid Factor      <20 IU/mL <20   Hepatitis C Antibody      NR Nonreactive     Assay performance characteristics have not been established for newborns,     infants, and children   Vitamin D Deficiency screening      30 - 75 ug/L 21 (L)   TSH      0.40 - 4.00 mU/L 3.95   Treponema pallidum Antibody      NEG Negative   CYRUS  Broad Spectrum       Neg   Lupus Result      NEG Negative    (Note)    COMMENTS:    The INR is normal.    APTT is normal.  1:2 Mix is not indicated.    DRVVT Screen is normal.    Thrombin time is normal.    NEGATIVE TEST; A LUPUS ANTICOAGULANT WAS NOT DETECTED IN THIS    SPECIMEN WITHIN THE LIMITS OF THE TESTING REPERTOIRE.    If the clinical picture is strongly suggestive of an antiphospholipid    syndrome, recommend anticardiolipin and beta-2-glycoprotein (IgG and    IgM) antibody tests.    Selene Sparks M.D.  [phone]    1/9/2015        INR =   0.98     Reference range: 0.86-1.14    Thrombin Time=  15.7     Reference range: 13.0-19.0 sec        APTT:     Seconds    Reagent =   Stago LA    Patient  =   39    1:2 Mix  =   N/A    Reference range=   31-45         DILUTE DELMI VIPER VENOM TEST:    DRVVT Screen Ratio =  0.85    Normal is less than 1.21   Miscellaneous Test       Specimen Received, Reordered and sent to Performing laboratory - Report to     follow upon completion.    Centromere Antibody IgG      0.0 - 0.9 AI <0.2    Negative     Antibody index (AI) values reflect qualitative changes in antibody     concentration that cannot be directly associated with clinical condition or     disease state.   Creatinine Urine       16   XR WRIST BILATERAL G/E 3 VW, XR HAND BILATERAL G/E 3 VW 1/7/2015  5:02 PM   HISTORY: eval for RA, r/o erosions,Pain in joint, site unspecified  COMPARISON: None available.  FINDINGS: AP, PA, and oblique views of the bilateral hands and wrists  were obtained.  In the wrists, the alignment of the carpal bones is unremarkable. No  acute fractures noted. No significant erosions or degenerative disease  identified.  In the hand, the joint spaces are maintained. The alignment is within  normal limits. There are no acute fractures identified. There is  subtle periarticular osteopenia at the metacarpal heads and bases of  the proximal phalanges.  IMPRESSION  IMPRESSION:   1. Subtle periarticular osteopenia in the hand.  2. No acute fractures identified.  I have personally reviewed the examination and initial interpretation  and I agree with the findings.    SURGICAL PATHOLOGY EXAM    Result  Value  Ref Range     Copath Report       Value:  Patient Name: LIZETH HAYES  MR#: [id number]  Specimen #:   Collected: 1/20/2015  Received: 1/20/2015  Reported: 1/23/2015 20:16  Ordering Phy(s): SHIELA POP              SPECIMEN(S):  Lip biopsy    FINAL DIAGNOSIS:  Oral mucosa, lower lip, biopsy:  -Lobules of minor salivary glands with mild non-specific sialadenitis  -Focus score = 0    I have personally reviewed all specimens and or slides, including the  listed special stains, and used them with my medical judgement to  determine the final diagnosis.    Electronically signed out by:    Laney Kumar M.D., [facility]      CLINICAL HISTORY:  The patient is a 44-year-old woman who undergoes evaluation of Sj?gren's  disease. Operative procedure: Lower lip  "biopsy.      GROSS:  The specimen is received in formalin with proper patient identification,  labeled \"lip biopsy\". The specimen consists of two pink-tan soft tissue  fragments averaging 0.5 cm in greatest dimension, which are wrapped and  entirely submitted in cassett      e 1. (Dictated by: Yecenia Ruiz 1/20/2015  05:45 PM)    MICROSCOPIC:  Microscopic examination is performed.              CPT Codes:  A: 08761-CR2    TESTING LAB LOCATION:  [facility], [address] [address] [phone]    COLLECTION SITE:  Client: [facility]  Location: [facility] (B)       Component      Latest Ref Rng 6/26/2015   WBC      4.0 - 11.0 10e9/L 12.0 (H)   RBC Count      3.8 - 5.2 10e12/L 4.15   Hemoglobin      11.7 - 15.7 g/dL 14.2   Hematocrit      35.0 - 47.0 % 40.8   MCV      78 - 100 fl 98   MCH      26.5 - 33.0 pg 34.2 (H)   MCHC      31.5 - 36.5 g/dL 34.8   RDW      10.0 - 15.0 % 13.2   Platelet Count      150 - 450 10e9/L 215   Diff Method       Automated Method   % Neutrophils       70.5   % Lymphocytes       21.2   % Monocytes       4.9   % Eosinophils       2.8   % Basophils       0.3   % Immature Granulocytes       0.3   Absolute Neutrophil      1.6 - 8.3 10e9/L 8.4 (H)   Absolute Lymphocytes      0.8 - 5.3 10e9/L 2.5   Absolute Monocytes      0.0 - 1.3 10e9/L 0.6   Absolute Eosinophils      0.0 - 0.7 10e9/L 0.3   Absolute Basophils      0.0 - 0.2 10e9/L 0.0   Abs Immature Granulocytes      0 - 0.4 10e9/L 0.0   Sodium      133 - 144 mmol/L 136   Potassium      3.4 - 5.3 mmol/L 3.7   Chloride      94 - 109 mmol/L 106   Carbon Dioxide      20 - 32 mmol/L 26   Anion Gap      3 - 14 mmol/L 5   Glucose      70 - 99 mg/dL 77   Urea Nitrogen      7 - 30 mg/dL 8   Creatinine      0.52 - 1.04 mg/dL 0.77   GFR Estimate      >60 mL/min/1.7m2 81   GFR Estimate If Black      >60 mL/min/1.7m2 >90 . . .   Calcium      " 8.5 - 10.1 mg/dL 8.8   Bilirubin Total      0.2 - 1.3 mg/dL 0.5   Albumin      3.4 - 5.0 g/dL 4.1   Protein Total      6.8 - 8.8 g/dL 7.8   Alkaline Phosphatase      40 - 150 U/L 60   ALT      0 - 50 U/L 20   AST      0 - 45 U/L 20   Color Urine       Straw   Appearance Urine       Clear   Glucose Urine      NEG mg/dL Negative   Bilirubin Urine      NEG Negative   Ketones Urine      NEG mg/dL Negative   Specific Gravity Urine      1.003 - 1.035 1.003   Blood Urine      NEG Small (A)   pH Urine      5.0 - 7.0 pH 5.0   Protein Albumin Urine      NEG mg/dL Negative   Urobilinogen mg/dL      0.0 - 2.0 mg/dL Normal   Nitrite Urine      NEG Negative   Leukocyte Esterase Urine      NEG Negative   Source       Midstream Urine   WBC Urine      0 - 2 /HPF <1   RBC Urine      0 - 2 /HPF 1   Squamous Epithelial /HPF Urine      0 - 1 /HPF <1   Protein Random Urine       <0.05   Protein Total Urine g/gr Creatinine      0 - 0.2 g/g Cr Unable to calculate due to low value   Cardiolipin IgG Melvi      0 - 15.0 GPL <15.0 . . .   Cardiolipin IgM Melvi      0 - 12.5 MPL <12.5 . . .   Complement C3      76 - 169 mg/dL 109   Complement C4      15 - 50 mg/dL 23   CRP Inflammation      0.0 - 8.0 mg/L <2.9   Sed Rate      0 - 20 mm/h 11   DNA-ds      0 - 29 IU/mL <15 . . .   Creatinine Urine Random       22   Vitamin D Deficiency screening      30 - 75 ug/L 26 (L)         HISTORY REVIEW:  Past Medical History:   Diagnosis Date     Anxiety      Fibromyalgia      Hernia, ventral      Herpes simplex      Hives      Insomnia      Past Surgical History:   Procedure Laterality Date     C EXC VENTRAL HERNIA MESH W/DRAIN ABSCESS  2013    with mesh implantation      SECTION  x 2     CHOLECYSTECTOMY       EXCISE NODULE THYROID       HYSTERECTOMY  age 24    ovaries remained, for heavy periods     Family History   Problem Relation Age of Onset     DIABETES Mother      DIABETES Father      Hypertension Daughter      Depression Son       CEREBROVASCULAR DISEASE Maternal Grandmother      Lupus Maternal Aunt      cousin has MCTD     Social History     Social History     Marital status:      Spouse name: N/A     Number of children: 3     Years of education: N/A     Occupational History     unemployed      , 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    PMHx, FHx, SHx were reviewed, unchanged.      Outpatient Encounter Prescriptions as of 3/30/2017   Medication Sig Dispense Refill     hydroxychloroquine (PLAQUENIL) 200 MG tablet Take 1 tablet (200 mg) by mouth 2 times daily 180 tablet 0     RANITIDINE HCL PO Take by mouth 2 times daily       triamcinolone (KENALOG) 0.1 % paste Take by mouth 2 times daily Twice a day as needed for mouth/nose sores 5 g 3     atenolol (TENORMIN) 25 MG tablet Take 100 mg by mouth        ibuprofen (ADVIL,MOTRIN) 200 MG tablet        valACYclovir (VALTREX) 500 MG tablet        [DISCONTINUED] hydroxychloroquine (PLAQUENIL) 200 MG tablet Take 1 tablet (200 mg) by mouth 2 times daily 180 tablet 1     predniSONE (DELTASONE) 5 MG tablet TAKE ONE TABLET BY MOUTH AS DIRECTED AS NEEDED FOR FLARE UPS (Patient not taking: Reported on 3/30/2017) 20 tablet 1     [DISCONTINUED] vitamin D (ERGOCALCIFEROL) 82419 UNIT capsule Take 1 capsule (50,000 Units) by mouth every 7 days 12 capsule 0     [DISCONTINUED] TRIAZOLAM PO Take by mouth nightly as needed for sleep       [DISCONTINUED] fluconazole (DIFLUCAN) 150 MG tablet        No facility-administered encounter medications on file as of 3/30/2017.      No Known Allergies    Ph.E:    Vitals:     03/30/17 1130   BP: (!) 157/102   Pulse: 88   Weight: 64.4 kg (142 lb)         Constitutional: WD/WN. Pleasant. In no acute distress. Mother present.  Eyes: EOM intact, PERRLA, sclera anicteric, conj not injected  HEENT: No oral ulcers or thrush. NL salivary pool.   Neck: No cervical LAP or thyromegaly  Chest: Clear to auscultation bilaterally  CV: RRR, no murmurs/ rubs or gallops. No edema, clubbing or cyanosis.   GI: Abdomen is soft and non tender.   MS: No joint tenderness or active synovitis. Cool joints. No  joint deformities. Full ROM of the joints. No nodules.   Skin: No skin rash, malar rash, livedo, alopecia, digital ulcers or nail changes. +periungual erythema.  Neuro: A&O x 3. Grossly non focal  Psych: NL affect and mood    Assessment/ plan:    UCTD Dx 1/2015. 48 yo WF with h/o FMS, heavy ETOH use and +fh/o lupus/MCTD presented in 1/2015 with sx which were highly concerning for CTD (fatigue, IA, myalgia, sicca sx, raynaud's, oral/nasal ulcers). Further work up showed low C3, low titer +aCL IgM (less than 20) and evidence of early IA on x-rays. Was diagnosed with UCTD, did not meet diagnostic criteria for SLE or Sjogren's (focus score=0 on lip biopsy done 1/2015). Repeat labs in 6/2015, showed NL C3 and IgM. Lost f/u since 6/2015. She was on  mg qd 6/2015- possibly 3/2016 (can't recall stop date). She went through ETOH detox program and decided to take herself off many drugs, HCQ was one of them. Can't recall if it helped her or not as was busy dealing with ETOH withdrawal sx. Later noticed increased oral ulcers off HCQ.     HCQ was resumed in 10/2016 at 200 m gpo bid. Has several complaints today including ongoing arthralgia, hair loss, skin rashes and nail change but fatigue is her major complaint.      Fatigue could be due to chronic fatigue/FMS. Recommend a trial of cymbalta 30 mg po qd; risks were discussed. If i tdoes not help, could take prednisone: 4tab=20 mg qd x 5 days, 3tab=15 mg qd x 5  days, 2tab=10 mg qd x 5 days, 1 tab=5 mg qd x 5 days then stop as it could be CTD related.    Labs today, if NL TPMT, will add imuran 50 mg a day as her ongoing arthralgia could have an inflammatory component. Recommend to avoid MTX given h/o ETOH use.    Continue  mg bid     Kenalog in orabase paste for oral ulcers.    Vit D deficiency. It was replaced. Re-check level.    Abnormal AST/ALT in the past with neg hep B/C and autoimmune work up, h/o ETOH use which could explain abnormal LFTs. Re-check LFTs were WNL.    +aCL IgM. Low titer+ but below 20, re-check was nl in 6/2015.    LOC on 3/7/2015 lead to ICH s/p surgery. It was due to ETOH use not seizures.    HCQ monitoring. Was reminded to have eye exam    Hair, skin, nail changes. Refer to derm to see if it's related to CTD    PLAN: Follow up prn, she wishes to transfer care to [place] which is close to her. i recommended her to see Dr. Mancia.    MEDICATION CHANGES: as above    Orders Placed This Encounter   Procedures     Albumin level     ALT     AST     CBC with platelets differential     Creatinine     CRP inflammation     Antinuclear antibody screen by EIA     ESR     Cryoglobulin quantitative     Antineutrophil cytoplasmic Melvi IgG     Vasculitis panel     Complement C3     Complement C4     DNA double stranded antibodies     Rheumatoid factor     Cyclic Citrullinated Peptide Antibody IgG     TPMT Genotype     RHEUMATOLOGY REFERRAL     DERMATOLOGY REFERRAL     INTERNAL MEDICINE REFERRAL             Valentin Castro MD

## 2017-03-30 NOTE — LETTER
Patient:  Marleen Castillo  :   1970  MRN:     [id number]        Ms.Connie TYLER Castillo  [address] [facility] [address]        2017    Dear ,    We are writing to inform you of your test results. TPMT is not normal, this means that if you try imuran in future, you should be monitored very closely. Are you now seeing Dr. Mancia? You could discuss these results with her.      Resulted Orders   Vasculitis panel   Result Value Ref Range    Myeloperoxidase Antibody IgG  0.0 - 0.9 AI     <0.2  Negative   Antibody index (AI) values reflect qualitative changes in antibody   concentration that cannot be directly associated with clinical condition or   disease state.      Proteinase 3 Antibody IgG  0.0 - 0.9 AI     <0.2  Negative   Antibody index (AI) values reflect qualitative changes in antibody   concentration that cannot be directly associated with clinical condition or   disease state.     Complement C3   Result Value Ref Range    Complement C3 84 76 - 169 mg/dL   Complement C4   Result Value Ref Range    Complement C4 22 15 - 50 mg/dL   DNA double stranded antibodies   Result Value Ref Range    DNA-ds <1  Negative   <10 IU/mL   TPMT Genotype   Result Value Ref Range    Lab Scanned Result TPMT OSHOSOUD-[id number]        Valentin Castro MD

## 2017-03-30 NOTE — LETTER
Patient:  Marleen Castillo  :   1970  MRN:     [id number]        Ms.Connie TYLER Castillo  [address] [facility] [address]        2017    Dear ,    We are writing to inform you of your test results. Low vitamin D was replaced.      Resulted Orders   Albumin level   Result Value Ref Range    Albumin 3.8 3.4 - 5.0 g/dL   ALT   Result Value Ref Range    ALT 21 0 - 50 U/L   AST   Result Value Ref Range    AST 14 0 - 45 U/L   Creatinine   Result Value Ref Range    Creatinine 0.70 0.52 - 1.04 mg/dL    GFR Estimate 90 >60 mL/min/1.7m2      Comment:      Non  GFR Calc    GFR Estimate If Black >90   GFR Calc   >60 mL/min/1.7m2   CRP inflammation   Result Value Ref Range    CRP Inflammation <2.9 0.0 - 8.0 mg/L   Erythrocyte sedimentation rate auto   Result Value Ref Range    Sed Rate 10 0 - 20 mm/h   CBC with platelets differential   Result Value Ref Range    WBC 8.8 4.0 - 11.0 10e9/L    RBC Count 4.04 3.8 - 5.2 10e12/L    Hemoglobin 13.7 11.7 - 15.7 g/dL    Hematocrit 40.4 35.0 - 47.0 %     78 - 100 fl    MCH 33.9 (H) 26.5 - 33.0 pg    MCHC 33.9 31.5 - 36.5 g/dL    RDW 11.9 10.0 - 15.0 %    Platelet Count 259 150 - 450 10e9/L    Diff Method Automated Method     % Neutrophils 63.9 %    % Lymphocytes 24.8 %    % Monocytes 7.4 %    % Eosinophils 2.4 %    % Basophils 0.8 %    % Immature Granulocytes 0.7 %    Nucleated RBCs 0 0 /100    Absolute Neutrophil 5.6 1.6 - 8.3 10e9/L    Absolute Lymphocytes 2.2 0.8 - 5.3 10e9/L    Absolute Monocytes 0.7 0.0 - 1.3 10e9/L    Absolute Eosinophils 0.2 0.0 - 0.7 10e9/L    Absolute Basophils 0.1 0.0 - 0.2 10e9/L    Abs Immature Granulocytes 0.1 0 - 0.4 10e9/L    Absolute Nucleated RBC 0.0    Routine UA with Micro Reflex to Culture   Result Value Ref Range    Color Urine Straw     Appearance Urine Clear     Glucose Urine Negative NEG mg/dL    Bilirubin Urine Negative NEG    Ketones Urine Negative NEG mg/dL    Specific Gravity Urine 1.003 1.003  - 1.035    Blood Urine Moderate (A) NEG    pH Urine 6.0 5.0 - 7.0 pH    Protein Albumin Urine Negative NEG mg/dL    Urobilinogen mg/dL 0.0 0.0 - 2.0 mg/dL    Nitrite Urine Negative NEG    Leukocyte Esterase Urine Negative NEG    Source Midstream Urine     WBC Urine <1 0 - 2 /HPF    RBC Urine 2 0 - 2 /HPF   Vitamin D Deficiency   Result Value Ref Range    Vitamin D Deficiency screening 15 (L) 20 - 75 ug/L      Comment:      Season, race, dietary intake, and treatment affect the concentration of   25-hydroxy-Vitamin D. Values may decrease during winter months and increase   during summer months. Values 20-29 ug/L may indicate Vitamin D insufficiency   and values <20 ug/L may indicate Vitamin D deficiency.   Vitamin D determination is routinely performed by an immunoassay specific for   25 hydroxyvitamin D3.  If an individual is on vitamin D2 (ergocalciferol)   supplementation, please specify 25 OH vitamin D2 and D3 level determination   by   LCMSMS test VITD23.     Antinuclear antibody screen by EIA   Result Value Ref Range    JAY Screen by EIA <1.0  Interpretation:  Negative   <1.0   Cryoglobulin quantitative   Result Value Ref Range    Cryoglobulin (A) NEG %     Trace   This test was developed and its performance characteristics determined by the   [facility],  Special Chemistry Laboratory. It has   not been cleared or approved by the FDA. The laboratory is regulated under CLIA   as qualified to perform high-complexity testing. This test is used for clinical   purposes. It should not be regarded as investigational or for research.     Antineutrophil cytoplasmic Melvi IgG   Result Value Ref Range    Neutrophil Cytoplasmic IgG Antibody       <1:20  Reference range: <1:20  (Note)  The ANCA IFA is <1:20; therefore, no further testing will  be performed.  INTERPRETIVE INFORMATION: Anti-Neutrophil Cyto Ab, IgG  Neutrophil Cytoplasmic Antibodies (C-ANCA = granular  cytoplasmic staining, P-ANCA =  perinuclear staining) are  found in the serum of over 90 percent of patients with  certain necrotizing systemic vasculitides, and usually in  less than 5 percent of patients with collagen vascular  disease or arthritis.  Performed by [facility],  [address] [place] [id number] [phone]  [email or web address], Alphonso Haynes MD, Lab. Director     Rheumatoid factor   Result Value Ref Range    Rheumatoid Factor <20 <20 IU/mL   Cyclic Citrullinated Peptide Antibody IgG   Result Value Ref Range    Cyclic Citrullinated Peptide Antibody, IgG 1 <7 U/mL      Comment:      Negative       Valentin Castro MD

## 2017-03-30 NOTE — MR AVS SNAPSHOT
After Visit Summary   3/30/2017    Marleen Castillo    MRN: [id number]           Patient Information     Date Of Birth          1970        Visit Information        Provider Department      3/30/2017 11:30 AM Valentin Castro MD [facility] Rheumatology        Today's Diagnoses     Undifferentiated connective tissue disease (H)    -  1    Facial rash        Connective tissue disease, undifferentiated (H)          Care Instructions    cymbalta 30 mg a day   Labs today, if OK, will add imuran 50 mg a day  Prednisone 4tab=20 mg qd x 5 days, 3tab=15 mg qd x 5 days, 2tab=10 mg qd x 5 days, 1 tab=5 mg qd x 5 days then stop  if cymbalta does not work  Referral to PCP, dermatology and Dr. Mancia  F/u with Dr. Mancia        Follow-ups after your visit        Additional Services     DERMATOLOGY REFERRAL       Facial rash            INTERNAL MEDICINE REFERRAL       Needs PCP            RHEUMATOLOGY REFERRAL       Dr. Mancia                  Your next 10 appointments already scheduled     Mar 30, 2017 12:45 PM CDT   Lab with  LAB   [facility] ([facility])    [address] [place] [id number] [phone]              Future tests that were ordered for you today     Open Future Orders        Priority Expected Expires Ordered    Albumin level Routine  3/30/2018 3/30/2017    ALT Routine  3/30/2018 3/30/2017    AST Routine  3/30/2018 3/30/2017    CBC with platelets differential Routine  3/30/2018 3/30/2017    Creatinine Routine  3/30/2018 3/30/2017    CRP inflammation Routine  3/30/2018 3/30/2017    Antinuclear antibody screen by EIA Routine  3/30/2018 3/30/2017    ESR Routine  3/30/2018 3/30/2017    Cryoglobulin quantitative Routine  3/30/2018 3/30/2017    Antineutrophil cytoplasmic Melvi IgG Routine  3/30/2018 3/30/2017    Rheumatoid factor Routine  3/30/2018 3/30/2017    Cyclic Citrullinated Peptide Antibody IgG Routine  3/30/2018 3/30/2017            Who to  contact     If you have questions or need follow up information about today's clinic visit or your schedule please contact [facility] RHEUMATOLOGY directly at [phone].  Normal or non-critical lab and imaging results will be communicated to you by [employer], letter or phone within 4 business days after the clinic has received the results. If you do not hear from us within 7 days, please contact the clinic through [employer] or phone. If you have a critical or abnormal lab result, we will notify you by phone as soon as possible.  Submit refill requests through [employer] or call your pharmacy and they will forward the refill request to us. Please allow 3 business days for your refill to be completed.          Additional Information About Your Visit        [employer] Information     [employer] gives you secure access to your electronic health record. If you see a primary care provider, you can also send messages to your care team and make appointments. If you have questions, please call your primary care clinic.  If you do not have a primary care provider, please call [phone] and they will assist you.        Care EveryWhere ID     This is your Care EveryWhere ID. This could be used by other organizations to access your [place] medical records  [id number]        Your Vitals Were     Pulse BMI (Body Mass Index)                88 24.37 kg/m2           Blood Pressure from Last 3 Encounters:   03/30/17 (!) 157/102   07/01/16 167/88   06/26/15 (!) 147/101    Weight from Last 3 Encounters:   03/30/17 64.4 kg (142 lb)   07/01/16 70.7 kg (155 lb 14.4 oz)   06/26/15 71.2 kg (157 lb)              We Performed the Following     Complement C3     Complement C4     DERMATOLOGY REFERRAL     DNA double stranded antibodies     INTERNAL MEDICINE REFERRAL     RHEUMATOLOGY REFERRAL     TPMT Genotype     Vasculitis panel          Today's Medication Changes          These changes are accurate as of: 3/30/17 12:24 PM.  If you have any  questions, ask your nurse or doctor.               Start taking these medicines.        Dose/Directions    DULoxetine 30 MG EC capsule   Commonly known as:  CYMBALTA   Used for:  Undifferentiated connective tissue disease (H), Facial rash   Started by:  Valentin Castro MD        Dose:  30 mg   Take 1 capsule (30 mg) by mouth daily   Quantity:  30 capsule   Refills:  3         These medicines have changed or have updated prescriptions.        Dose/Directions    hydroxychloroquine 200 MG tablet   Commonly known as:  PLAQUENIL   This may have changed:  Another medication with the same name was removed. Continue taking this medication, and follow the directions you see here.   Used for:  Diffuse disease of connective tissue (H)   Changed by:  Valentin Castro MD        Dose:  200 mg   Take 1 tablet (200 mg) by mouth 2 times daily   Quantity:  180 tablet   Refills:  0       predniSONE 5 MG tablet   Commonly known as:  DELTASONE   This may have changed:  additional instructions   Used for:  Connective tissue disease, undifferentiated (H)   Changed by:  Valentin Castro MD        4tab=20 mg qd x 5 days, 3tab=15 mg qd x 5 days, 2tab=10 mg qd x 5 days, 1 tab=5 mg qd x 5 days then stop   Quantity:  50 tablet   Refills:  0            Where to get your medicines      These medications were sent to [facility] [address] [place] [id number]     Phone:  [phone]     DULoxetine 30 MG EC capsule    predniSONE 5 MG tablet                Primary Care Provider Office Phone # Fax #    Robert FELIPA Tucker [phone]       [address] [address]        Thank you!     Thank you for choosing [facility] RHEUMATOLOGY  for your care. Our goal is always to provide you with excellent care. Hearing back from our patients is one way we can continue to improve our services. Please take a few minutes to complete the written survey that you  may receive in the mail after your visit with us. Thank you!             Your Updated Medication List - Protect others around you: Learn how to safely use, store and throw away your medicines at www.disposemymeds.org.          This list is accurate as of: 3/30/17 12:24 PM.  Always use your most recent med list.                   Brand Name Dispense Instructions for use    atenolol 25 MG tablet    TENORMIN     Take 100 mg by mouth       DULoxetine 30 MG EC capsule    CYMBALTA    30 capsule    Take 1 capsule (30 mg) by mouth daily       hydroxychloroquine 200 MG tablet    PLAQUENIL    180 tablet    Take 1 tablet (200 mg) by mouth 2 times daily       ibuprofen 200 MG tablet    ADVIL/MOTRIN         predniSONE 5 MG tablet    DELTASONE    50 tablet    4tab=20 mg qd x 5 days, 3tab=15 mg qd x 5 days, 2tab=10 mg qd x 5 days, 1 tab=5 mg qd x 5 days then stop       RANITIDINE HCL PO      Take by mouth 2 times daily       triamcinolone 0.1 % paste    KENALOG    5 g    Take by mouth 2 times daily Twice a day as needed for mouth/nose sores       valACYclovir 500 MG tablet    VALTREX

## 2017-03-31 LAB
ANA SER QL IA: NORMAL
C3 SERPL-MCNC: 84 MG/DL (ref 76–169)
C4 SERPL-MCNC: 22 MG/DL (ref 15–50)
CCP AB SER IA-ACNC: 1 U/ML
DSDNA AB SER-ACNC: NORMAL IU/ML
MYELOPEROXIDASE AB SER-ACNC: NORMAL AI (ref 0–0.9)
PROTEINASE3 IGG SER-ACNC: NORMAL AI (ref 0–0.9)
RHEUMATOID FACT SER NEPH-ACNC: <20 IU/ML (ref 0–20)

## 2017-04-03 LAB — ANCA IGG TITR SER IF: NORMAL {TITER}

## 2017-04-05 LAB
CRYOGLOB SER QL: ABNORMAL %
LAB SCANNED RESULT: NORMAL

## 2017-04-18 ENCOUNTER — TELEPHONE (OUTPATIENT)
Dept: DERMATOLOGY | Facility: CLINIC | Age: 47
End: 2017-04-18

## 2017-04-18 NOTE — TELEPHONE ENCOUNTER
----- Message from Sheryl Sebastian RN sent at 4/18/2017  9:54 AM CDT -----  Regarding: FW: Dr Castro Pt- Needs referral faxed   Please print referrals and find number to fax to [facility].    Thanks Sheryl  ----- Message -----     From: Isela Werner     Sent: 4/18/2017   8:56 AM       To: Adult Rheum Triage-  Subject: Dr Castro Pt- Needs referral faxed               HelDr Matthew huogh wrote referrals for pt to [facility] for Rheum, Derm and Int. Med. [facility] advised pt they received her records but no referral and are requesting those be sent to them. Pt did not have fax number for [facility].     Thank you!     Isela   Call Center    Please DO NOT send this message and/or reply back to sender.  Call Center Representatives DO NOT respond to messages.

## 2017-04-18 NOTE — TELEPHONE ENCOUNTER
Writer left a detailed message at home number informing her that referrals have been faxed over to dermatology, rheumatology and internal medicine. Status states received and instructed patient to call clinic with any further issues.     Oanh Brenner LPN

## 2017-04-20 DIAGNOSIS — M35.9 DIFFUSE DISEASE OF CONNECTIVE TISSUE (H): ICD-10-CM

## 2017-04-20 DIAGNOSIS — M35.9 CONNECTIVE TISSUE DISEASE, UNDIFFERENTIATED (H): ICD-10-CM

## 2017-04-20 NOTE — TELEPHONE ENCOUNTER
Triamcinolone 0.1% paste      Last Written Prescription Date:  7-1-16  Last Fill Quantity: 5 g,   # refills: 3  Last Office Visit : 3-30-17  Future Office visit:  none    Routing refill request to provider for review/approval because:  Drug not on the [facility], [facility] or [facility] refill protocol.    Kathleen M Doege RN

## 2017-04-21 RX ORDER — TRIAMCINOLONE ACETONIDE 0.1 %
PASTE (GRAM) DENTAL 2 TIMES DAILY
Qty: 5 G | Refills: 3 | Status: SHIPPED | OUTPATIENT
Start: 2017-04-21 | End: 2017-12-04

## 2017-05-08 DIAGNOSIS — L30.9 PERIORBITAL DERMATITIS: ICD-10-CM

## 2017-05-11 RX ORDER — METRONIDAZOLE 7.5 MG/G
GEL TOPICAL
Qty: 45 G | Refills: 0 | Status: SHIPPED | OUTPATIENT
Start: 2017-05-11 | End: 2017-07-18

## 2017-05-11 NOTE — TELEPHONE ENCOUNTER
metroNIDAZOLE 0.75 %      Last Written Prescription Date: 3/30/17  Last Fill Quantity: 45G   # refills: 0  Last Office Visit : 3/30/17  Future Office visit:  NONE  Routing refill request to provider for review/approval because:  Drug not on the [facility], [facility] or [facility] refill protocol or controlled substance

## 2017-07-18 DIAGNOSIS — M35.9 CONNECTIVE TISSUE DISEASE, UNDIFFERENTIATED (H): ICD-10-CM

## 2017-07-18 DIAGNOSIS — L30.9 PERIORBITAL DERMATITIS: ICD-10-CM

## 2017-07-18 DIAGNOSIS — E55.9 VITAMIN D DEFICIENCY: ICD-10-CM

## 2017-07-20 RX ORDER — PREDNISONE 5 MG/1
TABLET ORAL
Qty: 20 TABLET | Refills: 0 | Status: SHIPPED
Start: 2017-07-20

## 2017-07-20 RX ORDER — ERGOCALCIFEROL 1.25 MG/1
CAPSULE, LIQUID FILLED ORAL
Qty: 12 CAPSULE | Refills: 0 | Status: SHIPPED
Start: 2017-07-20

## 2017-07-20 NOTE — TELEPHONE ENCOUNTER
metroNIDAZOLE  0.75 % topical gel      Last Written Prescription Date:  5/11/17  Last Fill Quantity: 45G,   # refills: 0  Last Office Visit : 3/30/17  Future Office visit:  0  Routing refill request to provider for review/approval because:  Drug not on the refill protocol or controlled substance

## 2017-07-21 RX ORDER — METRONIDAZOLE 7.5 MG/G
GEL TOPICAL
Qty: 45 G | Refills: 0 | Status: SHIPPED | OUTPATIENT
Start: 2017-07-21 | End: 2017-12-04

## 2017-07-25 ENCOUNTER — TELEPHONE (OUTPATIENT)
Dept: RHEUMATOLOGY | Facility: CLINIC | Age: 47
End: 2017-07-25

## 2017-07-25 NOTE — TELEPHONE ENCOUNTER
[facility] pharmacy requesting clarification on prednisone: if Rx is denied. If not, asking for directions. Please advise ASAP as have had Rx for a wk. Sent to [facility].

## 2017-07-25 NOTE — TELEPHONE ENCOUNTER
I sent a msg before and said that she wanted to switch provider, to see somebody close to home. Did she change her mind? That's why I did not refill.

## 2017-07-25 NOTE — TELEPHONE ENCOUNTER
Called pharmacy back to inform that yes Rx denied and not to be refilled at this time, patient seeking care from other provider.

## 2017-08-01 DIAGNOSIS — E55.9 VITAMIN D DEFICIENCY: ICD-10-CM

## 2017-08-01 RX ORDER — CHOLECALCIFEROL (VITAMIN D3) 50 MCG
2000 TABLET ORAL DAILY
Qty: 30 TABLET | Refills: 0 | Status: SHIPPED | OUTPATIENT
Start: 2017-08-01 | End: 2017-12-04

## 2017-08-01 NOTE — TELEPHONE ENCOUNTER
Last seen: 3/30/17  Pending appt: none   Medication: Vitamin D 2000 IU    Last filled: new Rx   Qty: 30 tabs 0 refills   Oanh Brenner LPN

## 2017-08-02 NOTE — TELEPHONE ENCOUNTER
Writer spoke with patient and informed her of Dr. Castro's note. Patient stated understanding and reported that she will schedule an appointment with new rheumatologist.   Oanh Brenner LPN

## 2017-08-10 DIAGNOSIS — M35.9 DIFFUSE DISEASE OF CONNECTIVE TISSUE (H): ICD-10-CM

## 2017-08-10 DIAGNOSIS — M35.9 UNDIFFERENTIATED CONNECTIVE TISSUE DISEASE (H): ICD-10-CM

## 2017-08-10 DIAGNOSIS — M35.9 CONNECTIVE TISSUE DISEASE, UNDIFFERENTIATED (H): ICD-10-CM

## 2017-08-10 DIAGNOSIS — R21 FACIAL RASH: ICD-10-CM

## 2017-08-10 DIAGNOSIS — L30.9 PERIORBITAL DERMATITIS: ICD-10-CM

## 2017-08-13 NOTE — TELEPHONE ENCOUNTER
metroNIDAZOLE        Last Written Prescription Date:  7/21/17  Last Fill Quantity: 45g,   # refills: 0  Last Office Visit : 3/30/17  Future Office visit:  NONE  Routing refill request to provider for review/approval because:  Drug not on refill protocoL      triamcinolone       Last Written Prescription Date:  4/21/17  Last Fill Quantity: 5G,   # refills: 3  Routing refill request to provider for review/approval because:  Drug not on refill protocol      DULoxetine   Last Written Prescription Date:  3/30/17  Last Fill Quantity: 30,   # refills: 3  Routing refill request to provider for review/approval because:  Drug not on  refill protocol

## 2017-08-14 RX ORDER — METRONIDAZOLE 7.5 MG/G
GEL TOPICAL
Qty: 45 G | Refills: 0 | OUTPATIENT
Start: 2017-08-14

## 2017-08-14 RX ORDER — DULOXETIN HYDROCHLORIDE 30 MG/1
30 CAPSULE, DELAYED RELEASE ORAL DAILY
Qty: 30 CAPSULE | Refills: 3 | OUTPATIENT
Start: 2017-08-14

## 2017-08-14 RX ORDER — TRIAMCINOLONE ACETONIDE 0.1 %
PASTE (GRAM) DENTAL
Qty: 5 G | Refills: 3 | OUTPATIENT
Start: 2017-08-14

## 2017-08-14 NOTE — TELEPHONE ENCOUNTER
I think she is doing follow up with Dr. Mancia now. I did not refill meds, please call her to confirm.

## 2017-11-19 ENCOUNTER — HEALTH MAINTENANCE LETTER (OUTPATIENT)
Age: 47
End: 2017-11-19

## 2017-12-04 DIAGNOSIS — M35.9 CONNECTIVE TISSUE DISEASE, UNDIFFERENTIATED (H): ICD-10-CM

## 2017-12-04 DIAGNOSIS — E55.9 VITAMIN D DEFICIENCY: ICD-10-CM

## 2017-12-04 DIAGNOSIS — R21 FACIAL RASH: ICD-10-CM

## 2017-12-04 DIAGNOSIS — L30.9 PERIORBITAL DERMATITIS: ICD-10-CM

## 2017-12-04 DIAGNOSIS — M35.9 DIFFUSE DISEASE OF CONNECTIVE TISSUE (H): ICD-10-CM

## 2017-12-04 DIAGNOSIS — M35.9 UNDIFFERENTIATED CONNECTIVE TISSUE DISEASE (H): ICD-10-CM

## 2017-12-14 RX ORDER — TRIAMCINOLONE ACETONIDE 0.1 %
PASTE (GRAM) DENTAL 2 TIMES DAILY
Qty: 5 G | Refills: 3 | Status: SHIPPED | OUTPATIENT
Start: 2017-12-14

## 2017-12-14 RX ORDER — CHOLECALCIFEROL (VITAMIN D3) 50 MCG
2000 TABLET ORAL DAILY
Qty: 30 TABLET | Refills: 0 | Status: SHIPPED | OUTPATIENT
Start: 2017-12-14

## 2017-12-14 RX ORDER — METRONIDAZOLE 7.5 MG/G
GEL TOPICAL
Qty: 45 G | Refills: 0 | Status: SHIPPED | OUTPATIENT
Start: 2017-12-14

## 2017-12-14 RX ORDER — DULOXETIN HYDROCHLORIDE 30 MG/1
30 CAPSULE, DELAYED RELEASE ORAL DAILY
Qty: 30 CAPSULE | Refills: 3 | Status: SHIPPED | OUTPATIENT
Start: 2017-12-14

## 2017-12-14 NOTE — TELEPHONE ENCOUNTER
Patient said she wants to continue to follow with Dr. Castro, and that she needs to make an appointment.  Transferred her to [facility] scheduling line.

## 2018-01-25 ENCOUNTER — TELEPHONE (OUTPATIENT)
Dept: RHEUMATOLOGY | Facility: CLINIC | Age: 48
End: 2018-01-25

## 2018-01-25 NOTE — TELEPHONE ENCOUNTER
Called and spoke with pharmacy, insurance is now requiring that a specific area for use be placed on instructions for medication.    Left message for pt requesting return call to discuss.      Will send to provider to see if she is able to indicate where it should be applied.    Sheryl Sebastian RN  Rheumatology Clinic

## 2018-01-25 NOTE — TELEPHONE ENCOUNTER
[facility] called re: metronidazole. Asking where med should be applied on body. Please advise at [phone]. Sent to rheum.

## 2018-01-26 NOTE — TELEPHONE ENCOUNTER
Valentin Castro MD Beard, Madeline, RN        Caller: Unspecified (Today,  9:22 AM)                     Face for acne rosacea

## 2020-03-02 ENCOUNTER — HEALTH MAINTENANCE LETTER (OUTPATIENT)
Age: 50
End: 2020-03-02

## 2020-12-14 ENCOUNTER — HEALTH MAINTENANCE LETTER (OUTPATIENT)
Age: 50
End: 2020-12-14

## 2021-04-18 ENCOUNTER — HEALTH MAINTENANCE LETTER (OUTPATIENT)
Age: 51
End: 2021-04-18

## 2021-10-02 ENCOUNTER — HEALTH MAINTENANCE LETTER (OUTPATIENT)
Age: 51
End: 2021-10-02

## 2022-01-22 ENCOUNTER — HEALTH MAINTENANCE LETTER (OUTPATIENT)
Age: 52
End: 2022-01-22

## 2022-05-14 ENCOUNTER — HEALTH MAINTENANCE LETTER (OUTPATIENT)
Age: 52
End: 2022-05-14

## 2022-09-03 ENCOUNTER — HEALTH MAINTENANCE LETTER (OUTPATIENT)
Age: 52
End: 2022-09-03

## 2023-04-29 ENCOUNTER — HEALTH MAINTENANCE LETTER (OUTPATIENT)
Age: 53
End: 2023-04-29

## 2023-06-03 ENCOUNTER — HEALTH MAINTENANCE LETTER (OUTPATIENT)
Age: 53
End: 2023-06-03